# Patient Record
Sex: FEMALE | Race: BLACK OR AFRICAN AMERICAN | NOT HISPANIC OR LATINO | URBAN - METROPOLITAN AREA
[De-identification: names, ages, dates, MRNs, and addresses within clinical notes are randomized per-mention and may not be internally consistent; named-entity substitution may affect disease eponyms.]

---

## 2024-02-02 ENCOUNTER — EMERGENCY (EMERGENCY)
Facility: HOSPITAL | Age: 59
LOS: 0 days | Discharge: HOME | End: 2024-02-02
Attending: STUDENT IN AN ORGANIZED HEALTH CARE EDUCATION/TRAINING PROGRAM
Payer: COMMERCIAL

## 2024-02-02 VITALS
SYSTOLIC BLOOD PRESSURE: 105 MMHG | WEIGHT: 190.92 LBS | OXYGEN SATURATION: 98 % | HEART RATE: 102 BPM | RESPIRATION RATE: 19 BRPM | DIASTOLIC BLOOD PRESSURE: 71 MMHG | TEMPERATURE: 98 F | HEIGHT: 66 IN

## 2024-02-02 VITALS
SYSTOLIC BLOOD PRESSURE: 111 MMHG | HEART RATE: 98 BPM | RESPIRATION RATE: 19 BRPM | DIASTOLIC BLOOD PRESSURE: 80 MMHG | TEMPERATURE: 98 F | OXYGEN SATURATION: 98 %

## 2024-02-02 DIAGNOSIS — M25.562 PAIN IN LEFT KNEE: ICD-10-CM

## 2024-02-02 PROCEDURE — 93971 EXTREMITY STUDY: CPT | Mod: 26,LT

## 2024-02-02 PROCEDURE — 73564 X-RAY EXAM KNEE 4 OR MORE: CPT | Mod: 26,LT

## 2024-02-02 PROCEDURE — 99284 EMERGENCY DEPT VISIT MOD MDM: CPT

## 2024-02-02 RX ORDER — TRAMADOL HYDROCHLORIDE 50 MG/1
50 TABLET ORAL ONCE
Refills: 0 | Status: DISCONTINUED | OUTPATIENT
Start: 2024-02-02 | End: 2024-02-02

## 2024-02-02 RX ORDER — TRAMADOL HYDROCHLORIDE 50 MG/1
1 TABLET ORAL
Qty: 9 | Refills: 0
Start: 2024-02-02 | End: 2024-02-04

## 2024-02-02 RX ADMIN — TRAMADOL HYDROCHLORIDE 50 MILLIGRAM(S): 50 TABLET ORAL at 13:11

## 2024-02-02 NOTE — ED ADULT NURSE NOTE - NSFALLUNIVINTERV_ED_ALL_ED
Bed/Stretcher in lowest position, wheels locked, appropriate side rails in place/Call bell, personal items and telephone in reach/Instruct patient to call for assistance before getting out of bed/chair/stretcher/Non-slip footwear applied when patient is off stretcher/[place] to call system/Physically safe environment - no spills, clutter or unnecessary equipment/Purposeful proactive rounding/Room/bathroom lighting operational, light cord in reach

## 2024-02-02 NOTE — ED ADULT TRIAGE NOTE - CHIEF COMPLAINT QUOTE
Patient c/o left knee pain that started last night. Patient is currently having difficulty walking and using a cane. Patient denies any trauma or injury. Took tylenol with partial relief.  hx DM

## 2024-02-02 NOTE — ED ADULT NURSE NOTE - CHIEF COMPLAINT QUOTE
Problem: Infection  Goal: Absence of Infection Signs and Symptoms  Outcome: Ongoing, Progressing      Patient c/o left knee pain that started last night. Patient is currently having difficulty walking and using a cane. Patient denies any trauma or injury. Took tylenol with partial relief.  hx DM

## 2024-02-02 NOTE — ED PROVIDER NOTE - CARE PROVIDER_API CALL
Brenda Ziegler  Orthopaedic Surgery  [facility], [address] [address]  Phone: [phone]  Fax: [phone]  Follow Up Time:

## 2024-02-02 NOTE — ED ADULT NURSE NOTE - OBJECTIVE STATEMENT
pt is a 58 y.o. female c/o left knee pain, pt rates 9/10 pain, pt denies any prior injuries, pt is ax4, on room air, ambulatory w/ a cane due to the pain, pt has a hx of diabetes, breath sounds are non labored, no other concerns are noted.

## 2024-02-02 NOTE — ED PROVIDER NOTE - CLINICAL SUMMARY MEDICAL DECISION MAKING FREE TEXT BOX
58-year-old female with no past medical history presents today complaining of left knee pain since yesterday.  Patient denies trauma, denies prior history of knee pain.  Patient has been taking Tylenol with minimal relief, her pain is aggravated with weightbearing patient rates her pain 10 out of 10.  Pain is located anteriorly and posteriorly leg edema, denies leg edema or calf pain, denies travel, denies chest pain shortness of breath or palpitations or recent hormonal use.  Patient is using a cane in order to ambulate.  On exam patient is well-appearing appears in no distress, her left knee nonerythematous, no swelling.  Her knee is tender anteriorly less so posteriorly, no leg edema or pitting edema, positive pedal pulses.  Differential diagnosis includes but not limited to arthritis, DVT, Baker's cyst.  Will obtain an x-ray and sono and medicate for pain, will reassess and dispo    X-ray reviewed shows arthritic changes decreased bone space, no acute fracture  Sono negative for acute DVT or Baker's cyst  Patient does express relief of her pain with tramadol, will send prescription, follow-up given with orthopedics